# Patient Record
Sex: MALE | Race: BLACK OR AFRICAN AMERICAN | Employment: OTHER
[De-identification: names, ages, dates, MRNs, and addresses within clinical notes are randomized per-mention and may not be internally consistent; named-entity substitution may affect disease eponyms.]

---

## 2024-05-30 ENCOUNTER — APPOINTMENT (OUTPATIENT)
Facility: HOSPITAL | Age: 24
End: 2024-05-30

## 2024-05-30 ENCOUNTER — HOSPITAL ENCOUNTER (EMERGENCY)
Facility: HOSPITAL | Age: 24
Discharge: HOME | End: 2024-05-30
Attending: EMERGENCY MEDICINE

## 2024-05-30 VITALS
HEART RATE: 73 BPM | OXYGEN SATURATION: 100 % | TEMPERATURE: 98.6 F | HEIGHT: 67 IN | RESPIRATION RATE: 18 BRPM | DIASTOLIC BLOOD PRESSURE: 77 MMHG | SYSTOLIC BLOOD PRESSURE: 119 MMHG | BODY MASS INDEX: 21.19 KG/M2 | WEIGHT: 135 LBS

## 2024-05-30 DIAGNOSIS — R10.13 ABDOMINAL PAIN, EPIGASTRIC: Primary | ICD-10-CM

## 2024-05-30 LAB
ALBUMIN SERPL-MCNC: 4.1 G/DL (ref 3.5–5)
ALBUMIN/GLOB SERPL: 1.1 (ref 1.1–2.2)
ALP SERPL-CCNC: 58 U/L (ref 45–117)
ALT SERPL-CCNC: 24 U/L (ref 12–78)
ANION GAP SERPL CALC-SCNC: 7 MMOL/L (ref 5–15)
APPEARANCE UR: CLEAR
AST SERPL W P-5'-P-CCNC: 22 U/L (ref 15–37)
BACTERIA URNS QL MICRO: NEGATIVE /HPF
BASOPHILS # BLD: 0 K/UL (ref 0–0.1)
BASOPHILS NFR BLD: 0 % (ref 0–1)
BILIRUB SERPL-MCNC: 2.1 MG/DL (ref 0.2–1)
BILIRUB UR QL: NEGATIVE
BUN SERPL-MCNC: 10 MG/DL (ref 6–20)
BUN/CREAT SERPL: 11 (ref 12–20)
CA-I BLD-MCNC: 9 MG/DL (ref 8.5–10.1)
CHLORIDE SERPL-SCNC: 102 MMOL/L (ref 97–108)
CO2 SERPL-SCNC: 30 MMOL/L (ref 21–32)
COLOR UR: YELLOW
CREAT SERPL-MCNC: 0.91 MG/DL (ref 0.7–1.3)
DIFFERENTIAL METHOD BLD: NORMAL
EOSINOPHIL # BLD: 0.3 K/UL (ref 0–0.4)
EOSINOPHIL NFR BLD: 4 % (ref 0–7)
EPITH CASTS URNS QL MICRO: ABNORMAL /LPF
ERYTHROCYTE [DISTWIDTH] IN BLOOD BY AUTOMATED COUNT: 12 % (ref 11.5–14.5)
GLOBULIN SER CALC-MCNC: 3.8 G/DL (ref 2–4)
GLUCOSE SERPL-MCNC: 85 MG/DL (ref 65–100)
GLUCOSE UR STRIP.AUTO-MCNC: NEGATIVE MG/DL
HCT VFR BLD AUTO: 45.9 % (ref 36.6–50.3)
HGB BLD-MCNC: 15.9 G/DL (ref 12.1–17)
HGB UR QL STRIP: NEGATIVE
IMM GRANULOCYTES # BLD AUTO: 0 K/UL (ref 0–0.04)
IMM GRANULOCYTES NFR BLD AUTO: 0 % (ref 0–0.5)
KETONES UR QL STRIP.AUTO: NEGATIVE MG/DL
LEUKOCYTE ESTERASE UR QL STRIP.AUTO: NEGATIVE
LIPASE SERPL-CCNC: 107 U/L (ref 13–75)
LYMPHOCYTES # BLD: 3 K/UL (ref 0.8–3.5)
LYMPHOCYTES NFR BLD: 38 % (ref 12–49)
MCH RBC QN AUTO: 30.1 PG (ref 26–34)
MCHC RBC AUTO-ENTMCNC: 34.6 G/DL (ref 30–36.5)
MCV RBC AUTO: 86.9 FL (ref 80–99)
MONOCYTES # BLD: 0.8 K/UL (ref 0–1)
MONOCYTES NFR BLD: 10 % (ref 5–13)
NEUTS SEG # BLD: 3.8 K/UL (ref 1.8–8)
NEUTS SEG NFR BLD: 48 % (ref 32–75)
NITRITE UR QL STRIP.AUTO: NEGATIVE
PH UR STRIP: 8 (ref 5–8)
PLATELET # BLD AUTO: 227 K/UL (ref 150–400)
PMV BLD AUTO: 10 FL (ref 8.9–12.9)
POTASSIUM SERPL-SCNC: 4.2 MMOL/L (ref 3.5–5.1)
PROT SERPL-MCNC: 7.9 G/DL (ref 6.4–8.2)
PROT UR STRIP-MCNC: NEGATIVE MG/DL
RBC # BLD AUTO: 5.28 M/UL (ref 4.1–5.7)
RBC #/AREA URNS HPF: ABNORMAL /HPF (ref 0–5)
SODIUM SERPL-SCNC: 139 MMOL/L (ref 136–145)
SP GR UR REFRACTOMETRY: 1.01 (ref 1–1.03)
URINE CULTURE IF INDICATED: ABNORMAL
UROBILINOGEN UR QL STRIP.AUTO: 0.1 EU/DL (ref 0.2–1)
WBC # BLD AUTO: 7.9 K/UL (ref 4.1–11.1)
WBC URNS QL MICRO: ABNORMAL /HPF (ref 0–4)

## 2024-05-30 PROCEDURE — 85025 COMPLETE CBC W/AUTO DIFF WBC: CPT

## 2024-05-30 PROCEDURE — 6360000002 HC RX W HCPCS: Performed by: EMERGENCY MEDICINE

## 2024-05-30 PROCEDURE — 76705 ECHO EXAM OF ABDOMEN: CPT

## 2024-05-30 PROCEDURE — 81001 URINALYSIS AUTO W/SCOPE: CPT

## 2024-05-30 PROCEDURE — 80053 COMPREHEN METABOLIC PANEL: CPT

## 2024-05-30 PROCEDURE — 96374 THER/PROPH/DIAG INJ IV PUSH: CPT

## 2024-05-30 PROCEDURE — 99284 EMERGENCY DEPT VISIT MOD MDM: CPT

## 2024-05-30 PROCEDURE — 83690 ASSAY OF LIPASE: CPT

## 2024-05-30 RX ORDER — KETOROLAC TROMETHAMINE 15 MG/ML
15 INJECTION, SOLUTION INTRAMUSCULAR; INTRAVENOUS ONCE
Status: COMPLETED | OUTPATIENT
Start: 2024-05-30 | End: 2024-05-30

## 2024-05-30 RX ADMIN — KETOROLAC TROMETHAMINE 15 MG: 15 INJECTION, SOLUTION INTRAMUSCULAR; INTRAVENOUS at 19:41

## 2024-05-30 ASSESSMENT — PAIN - FUNCTIONAL ASSESSMENT
PAIN_FUNCTIONAL_ASSESSMENT: 0-10
PAIN_FUNCTIONAL_ASSESSMENT: 0-10

## 2024-05-30 ASSESSMENT — PAIN DESCRIPTION - LOCATION: LOCATION: ABDOMEN

## 2024-05-30 ASSESSMENT — PAIN SCALES - GENERAL
PAINLEVEL_OUTOF10: 8
PAINLEVEL_OUTOF10: 2

## 2024-05-30 NOTE — ED PROVIDER NOTES
[facility] EMERGENCY DEPARTMENT  EMERGENCY DEPARTMENT HISTORY AND PHYSICAL EXAM      Date: 5/30/2024  Patient Name: Jonathon Casillas  MRN: [id number]  Birthdate 2000  Date of evaluation: 5/30/2024  Provider: Kam Sam DO   Note Started: 7:08 PM EDT 5/30/24    HISTORY OF PRESENT ILLNESS     Chief Complaint   Patient presents with    Abdominal Pain     History Provided By: Patient    HPI: Jonathon Casillas is a 23 y.o. male with no past medical history who presents with abdominal pain.  Patient has had 3 days of abdominal pain in the upper abdomen and on the right abdomen.  Has been fairly constant and 7/10.  Denies any nausea, vomiting, fevers, diarrhea, or other associated symptoms.  States he did have this problem when he was younger and it was constipation.  He has been trying to eat more fruits and vegetables.  He has been having some BMs however less.    PAST MEDICAL HISTORY   Past Medical History:  No past medical history on file.    Past Surgical History:  No past surgical history on file.    Family History:  No family history on file.    Social History:       Allergies:  Allergies   Allergen Reactions    Shellfish Allergy Hives       PCP: No primary care provider on file.    Current Meds:   No current facility-administered medications for this encounter.     No current outpatient medications on file.     Social Determinants of Health:   Social Determinants of Health     Tobacco Use: Not on file   Alcohol Use: Not on file   Financial Resource Strain: Not on file   Food Insecurity: Not on file   Transportation Needs: Not on file   Physical Activity: Not on file   Stress: Not on file   Social Connections: Not on file   Intimate Partner Violence: Not on file   Depression: Not on file   Housing Stability: Not on file   Interpersonal Safety: Not on file   Utilities: Not on file     PHYSICAL EXAM   Constitutional: No acute distress.  Cardiac: Normal perfusion.  Respiratory: No apparent respiratory

## 2024-05-31 NOTE — DISCHARGE INSTRUCTIONS
Thank you for choosing our Emergency Department for your care.  It is our privilege to care for you in your time of need.  In the next several days, you may receive a survey via email or mailed to your home about your experience with our team.  We would greatly appreciate you taking a few minutes to complete the survey, as we use this information to learn what we have done well and what we could be doing better. Thank you for trusting us with your care!    Below you will find a list of your tests from today's visit.   Labs  Recent Results (from the past 12 hour(s))   CBC with Auto Differential    Collection Time: 05/30/24  6:10 PM   Result Value Ref Range    WBC 7.9 4.1 - 11.1 K/uL    RBC 5.28 4.10 - 5.70 M/uL    Hemoglobin 15.9 12.1 - 17.0 g/dL    Hematocrit 45.9 36.6 - 50.3 %    MCV 86.9 80.0 - 99.0 FL    MCH 30.1 26.0 - 34.0 PG    MCHC 34.6 30.0 - 36.5 g/dL    RDW 12.0 11.5 - 14.5 %    Platelets 227 150 - 400 K/uL    MPV 10.0 8.9 - 12.9 FL    Neutrophils % 48 32 - 75 %    Lymphocytes % 38 12 - 49 %    Monocytes % 10 5 - 13 %    Eosinophils % 4 0 - 7 %    Basophils % 0 0 - 1 %    Immature Granulocytes % 0 0.0 - 0.5 %    Neutrophils Absolute 3.8 1.8 - 8.0 K/UL    Lymphocytes Absolute 3.0 0.8 - 3.5 K/UL    Monocytes Absolute 0.8 0.0 - 1.0 K/UL    Eosinophils Absolute 0.3 0.0 - 0.4 K/UL    Basophils Absolute 0.0 0.0 - 0.1 K/UL    Immature Granulocytes Absolute 0.0 0.00 - 0.04 K/UL    Differential Type AUTOMATED     Comprehensive Metabolic Panel    Collection Time: 05/30/24  6:10 PM   Result Value Ref Range    Sodium 139 136 - 145 mmol/L    Potassium 4.2 3.5 - 5.1 mmol/L    Chloride 102 97 - 108 mmol/L    CO2 30 21 - 32 mmol/L    Anion Gap 7 5 - 15 mmol/L    Glucose 85 65 - 100 mg/dL    BUN 10 6 - 20 mg/dL    Creatinine 0.91 0.70 - 1.30 mg/dL    BUN/Creatinine Ratio 11 (L) 12 - 20      Est, Glom Filt Rate >90 >60 ml/min/1.73m2    Calcium 9.0 8.5 - 10.1 mg/dL    Total Bilirubin 2.1 (H) 0.2 - 1.0 mg/dL    AST 22 15

## 2025-02-25 ENCOUNTER — HOSPITAL ENCOUNTER (EMERGENCY)
Facility: HOSPITAL | Age: 25
Discharge: HOME | End: 2025-02-25
Attending: FAMILY MEDICINE
Payer: COMMERCIAL

## 2025-02-25 VITALS
TEMPERATURE: 98.2 F | HEART RATE: 70 BPM | WEIGHT: 140 LBS | RESPIRATION RATE: 19 BRPM | DIASTOLIC BLOOD PRESSURE: 74 MMHG | BODY MASS INDEX: 21.97 KG/M2 | OXYGEN SATURATION: 99 % | HEIGHT: 67 IN | SYSTOLIC BLOOD PRESSURE: 121 MMHG

## 2025-02-25 DIAGNOSIS — T78.40XA ALLERGIC REACTION, INITIAL ENCOUNTER: Primary | ICD-10-CM

## 2025-02-25 PROCEDURE — 6360000002 HC RX W HCPCS: Performed by: FAMILY MEDICINE

## 2025-02-25 PROCEDURE — 6370000000 HC RX 637 (ALT 250 FOR IP): Performed by: FAMILY MEDICINE

## 2025-02-25 PROCEDURE — 99284 EMERGENCY DEPT VISIT MOD MDM: CPT

## 2025-02-25 PROCEDURE — 96372 THER/PROPH/DIAG INJ SC/IM: CPT

## 2025-02-25 RX ORDER — FAMOTIDINE 20 MG/1
20 TABLET, FILM COATED ORAL
Status: COMPLETED | OUTPATIENT
Start: 2025-02-25 | End: 2025-02-25

## 2025-02-25 RX ORDER — DIPHENHYDRAMINE HCL 25 MG
25 CAPSULE ORAL EVERY 8 HOURS PRN
Qty: 9 CAPSULE | Refills: 0 | Status: SHIPPED | OUTPATIENT
Start: 2025-02-25 | End: 2025-02-28

## 2025-02-25 RX ORDER — PREDNISONE 20 MG/1
40 TABLET ORAL DAILY
Qty: 10 TABLET | Refills: 0 | Status: SHIPPED | OUTPATIENT
Start: 2025-02-25 | End: 2025-03-02

## 2025-02-25 RX ORDER — EPINEPHRINE 0.3 MG/.3ML
0.3 INJECTION SUBCUTANEOUS ONCE
Status: COMPLETED | OUTPATIENT
Start: 2025-02-25 | End: 2025-02-25

## 2025-02-25 RX ORDER — DIPHENHYDRAMINE HCL 25 MG
50 CAPSULE ORAL ONCE
Status: COMPLETED | OUTPATIENT
Start: 2025-02-25 | End: 2025-02-25

## 2025-02-25 RX ORDER — PREDNISONE 20 MG/1
60 TABLET ORAL
Status: COMPLETED | OUTPATIENT
Start: 2025-02-25 | End: 2025-02-25

## 2025-02-25 RX ORDER — EPINEPHRINE 0.3 MG/.3ML
0.3 INJECTION SUBCUTANEOUS
Qty: 1 EACH | Refills: 0 | Status: SHIPPED | OUTPATIENT
Start: 2025-02-25 | End: 2025-02-25

## 2025-02-25 RX ADMIN — DIPHENHYDRAMINE HYDROCHLORIDE 50 MG: 25 CAPSULE ORAL at 07:32

## 2025-02-25 RX ADMIN — EPINEPHRINE 0.3 MG: 0.3 INJECTION INTRAMUSCULAR at 07:55

## 2025-02-25 RX ADMIN — FAMOTIDINE 20 MG: 20 TABLET, FILM COATED ORAL at 07:32

## 2025-02-25 RX ADMIN — PREDNISONE 60 MG: 20 TABLET ORAL at 07:32

## 2025-02-25 ASSESSMENT — PAIN - FUNCTIONAL ASSESSMENT
PAIN_FUNCTIONAL_ASSESSMENT: NONE - DENIES PAIN
PAIN_FUNCTIONAL_ASSESSMENT: NONE - DENIES PAIN
PAIN_FUNCTIONAL_ASSESSMENT: 0-10

## 2025-02-25 ASSESSMENT — PAIN DESCRIPTION - DESCRIPTORS: DESCRIPTORS: TIGHTNESS

## 2025-02-25 ASSESSMENT — PAIN SCALES - GENERAL
PAINLEVEL_OUTOF10: 0
PAINLEVEL_OUTOF10: 8

## 2025-02-25 ASSESSMENT — PAIN DESCRIPTION - ORIENTATION: ORIENTATION: MID

## 2025-02-25 ASSESSMENT — LIFESTYLE VARIABLES
HOW OFTEN DO YOU HAVE A DRINK CONTAINING ALCOHOL: NEVER
HOW MANY STANDARD DRINKS CONTAINING ALCOHOL DO YOU HAVE ON A TYPICAL DAY: PATIENT DOES NOT DRINK

## 2025-02-25 ASSESSMENT — PAIN DESCRIPTION - LOCATION: LOCATION: CHEST

## 2025-02-25 NOTE — ED NOTES
Pts facial swelling and red, raised, and itchy bumps are notably improved. Pt reports he feels \"about the same.\"

## 2025-02-25 NOTE — ED NOTES
Pt reporting intermittent epigastric chest pain and throat tightness with difficulty swallowing. Physician informed and order for EpiPen placed.

## 2025-02-25 NOTE — ED TRIAGE NOTES
Pt reports hives that started around 1100 on Sunday. Pt reports wheezing that started yesterday. Pt now presents with facial swelling prominent around the eyes. Pt is unsure of the causative agent.

## 2025-02-26 NOTE — ED PROVIDER NOTES
EMERGENCY DEPARTMENT HISTORY AND PHYSICAL EXAM      Date: 2/25/2025  Patient Name: Jonathon Casillas    History of Presenting Illness     Chief Complaint   Patient presents with    Facial Swelling       History Provided By:     HPI: Jonathon Casillas, is a very pleasant 24 y.o. male presenting to the ED with a chief complaint of facial swelling.  States he recently developed hives and facial swelling.  Predominantly around his eyelids and cheeks.  Reports a history of allergies to shellfish but denies any known exposures.  No chest pain or shortness of breath.    Denies any other symptoms at this time.    PCP: No primary care provider on file.    No current facility-administered medications on file prior to encounter.     No current outpatient medications on file prior to encounter.       Past History     Past Medical History:  No past medical history on file.    Past Surgical History:  No past surgical history on file.    Family History:  No family history on file.    Social History:       Allergies:  Allergies   Allergen Reactions    Shellfish Allergy Hives         Review of Systems     Negative unless otherwise stated in HPI    Physical Exam     Physical Exam  Constitutional:       Appearance: Normal appearance.   HENT:      Head: Normocephalic and atraumatic.      Right Ear: External ear normal.      Left Ear: External ear normal.      Nose: Nose normal.      Mouth/Throat:      Comments: No lip nor intraoral swelling.  Uvula midline.  Tolerating secretions.  No stridor.  Eyes:      Extraocular Movements: Extraocular movements intact.      Conjunctiva/sclera: Conjunctivae normal.   Cardiovascular:      Rate and Rhythm: Normal rate and regular rhythm.   Pulmonary:      Effort: Pulmonary effort is normal. No respiratory distress.   Abdominal:      Palpations: Abdomen is soft.      Tenderness: There is no abdominal tenderness. There is no right CVA tenderness, left CVA tenderness, guarding or rebound.

## 2025-07-01 ENCOUNTER — APPOINTMENT (OUTPATIENT)
Facility: HOSPITAL | Age: 25
End: 2025-07-01

## 2025-07-01 ENCOUNTER — HOSPITAL ENCOUNTER (EMERGENCY)
Facility: HOSPITAL | Age: 25
Discharge: HOME | End: 2025-07-01
Attending: EMERGENCY MEDICINE

## 2025-07-01 VITALS
SYSTOLIC BLOOD PRESSURE: 130 MMHG | RESPIRATION RATE: 16 BRPM | BODY MASS INDEX: 21.97 KG/M2 | HEIGHT: 67 IN | OXYGEN SATURATION: 100 % | HEART RATE: 77 BPM | TEMPERATURE: 97.3 F | DIASTOLIC BLOOD PRESSURE: 72 MMHG | WEIGHT: 140 LBS

## 2025-07-01 DIAGNOSIS — S61.220A LACERATION OF RIGHT INDEX FINGER WITH FOREIGN BODY WITHOUT DAMAGE TO NAIL, INITIAL ENCOUNTER: Primary | ICD-10-CM

## 2025-07-01 PROCEDURE — 6370000000 HC RX 637 (ALT 250 FOR IP): Performed by: EMERGENCY MEDICINE

## 2025-07-01 PROCEDURE — 99283 EMERGENCY DEPT VISIT LOW MDM: CPT

## 2025-07-01 PROCEDURE — 73130 X-RAY EXAM OF HAND: CPT

## 2025-07-01 PROCEDURE — 12044 INTMD RPR N-HF/GENIT7.6-12.5: CPT

## 2025-07-01 PROCEDURE — 6360000002 HC RX W HCPCS: Performed by: EMERGENCY MEDICINE

## 2025-07-01 RX ORDER — HYDROCODONE BITARTRATE AND ACETAMINOPHEN 5; 325 MG/1; MG/1
1 TABLET ORAL
Refills: 0 | Status: COMPLETED | OUTPATIENT
Start: 2025-07-01 | End: 2025-07-01

## 2025-07-01 RX ORDER — GINSENG 100 MG
CAPSULE ORAL
Status: COMPLETED | OUTPATIENT
Start: 2025-07-01 | End: 2025-07-01

## 2025-07-01 RX ORDER — LIDOCAINE HYDROCHLORIDE 10 MG/ML
5 INJECTION, SOLUTION INFILTRATION; PERINEURAL ONCE
Status: DISCONTINUED | OUTPATIENT
Start: 2025-07-01 | End: 2025-07-01 | Stop reason: HOSPADM

## 2025-07-01 RX ORDER — LIDOCAINE HYDROCHLORIDE 10 MG/ML
10 INJECTION, SOLUTION EPIDURAL; INFILTRATION; INTRACAUDAL; PERINEURAL
Status: COMPLETED | OUTPATIENT
Start: 2025-07-01 | End: 2025-07-01

## 2025-07-01 RX ADMIN — HYDROCODONE BITARTRATE AND ACETAMINOPHEN 1 TABLET: 5; 325 TABLET ORAL at 19:37

## 2025-07-01 RX ADMIN — AMOXICILLIN AND CLAVULANATE POTASSIUM 1 TABLET: 875; 125 TABLET, FILM COATED ORAL at 20:31

## 2025-07-01 RX ADMIN — LIDOCAINE HYDROCHLORIDE 10 ML: 10 INJECTION, SOLUTION EPIDURAL; INFILTRATION; INTRACAUDAL; PERINEURAL at 20:11

## 2025-07-01 RX ADMIN — BACITRACIN: 500 OINTMENT TOPICAL at 20:31

## 2025-07-01 ASSESSMENT — PAIN DESCRIPTION - ORIENTATION
ORIENTATION: RIGHT
ORIENTATION: RIGHT

## 2025-07-01 ASSESSMENT — PAIN SCALES - GENERAL
PAINLEVEL_OUTOF10: 10
PAINLEVEL_OUTOF10: 10

## 2025-07-01 ASSESSMENT — PAIN DESCRIPTION - LOCATION
LOCATION: FINGER (COMMENT WHICH ONE)
LOCATION: HAND

## 2025-07-01 ASSESSMENT — PAIN - FUNCTIONAL ASSESSMENT: PAIN_FUNCTIONAL_ASSESSMENT: 0-10

## 2025-07-01 ASSESSMENT — LIFESTYLE VARIABLES
HOW MANY STANDARD DRINKS CONTAINING ALCOHOL DO YOU HAVE ON A TYPICAL DAY: PATIENT DOES NOT DRINK
HOW OFTEN DO YOU HAVE A DRINK CONTAINING ALCOHOL: MONTHLY OR LESS

## 2025-07-02 NOTE — ED PROVIDER NOTES
[facility] EMERGENCY DEPT  EMERGENCY DEPARTMENT HISTORY AND PHYSICAL EXAM      Date of evaluation: 7/1/2025  Patient Name: Jonathon Casillas  Birthdate 2000  MRN: [id number]  ED Provider: Sapna Castellanos MD   Note Started: 8:29 PM EDT 7/1/25    HISTORY OF PRESENT ILLNESS     Chief Complaint   Patient presents with    Hand Injury       History Provided By: Patient, only     HPI: Jonathon Casillas is a 24 y.o. male patient with his hand through a bridge door 1 no pain with a laceration to his index hand.  Bleeding still present.  Immunizations including tetanus are up-to-date    PAST MEDICAL HISTORY   Past Medical History:  History reviewed. No pertinent past medical history.    Past Surgical History:  History reviewed. No pertinent surgical history.    Family History:  History reviewed. No pertinent family history.    Social History:  Social History     Tobacco Use    Smoking status: Never    Smokeless tobacco: Never   Substance Use Topics    Alcohol use: Never    Drug use: Never       Allergies:  Allergies   Allergen Reactions    Shellfish Allergy Hives       PCP: No primary care provider on file.    Current Meds:   Current Facility-Administered Medications   Medication Dose Route Frequency Provider Last Rate Last Admin    lidocaine 1 % injection 5 mL  5 mL IntraDERmal Once Sapna Castellanos MD        amoxicillin-clavulanate (AUGMENTIN) 875-125 MG per tablet 1 tablet  1 tablet Oral NOW Sapna Castellanos MD        bacitracin ointment   Topical NOW Sapna Castellanos MD         Current Outpatient Medications   Medication Sig Dispense Refill    amoxicillin-clavulanate (AUGMENTIN) 875-125 MG per tablet Take 1 tablet by mouth 2 times daily for 10 days 20 tablet 0    EPINEPHrine (EPIPEN 2-CHEYENNE) 0.3 MG/0.3ML SOAJ injection Inject 0.3 mLs into the muscle once as needed (Severe allergy) Use as directed for allergic reaction 1 each 0       Social Determinants of Health:   Social Drivers of Health     Tobacco Use: Low Risk

## 2025-07-02 NOTE — DISCHARGE INSTRUCTIONS
Thank you for choosing our Emergency Department for your care.  It is our privilege to care for you in your time of need.  In the next several days, you may receive a survey via email or mailed to your home about your experience with our team.  We would greatly appreciate you taking a few minutes to complete the survey, as we use this information to learn what we have done well and what we could be doing better. Thank you for trusting us with your care!    Below you will find a list of your tests from today's visit.   Labs and Radiology Studies  No results found for this or any previous visit (from the past 12 hours).  XR HAND RIGHT (MIN 3 VIEWS)  Result Date: 7/1/2025  EXAM: XR HAND RIGHT (MIN 3 VIEWS) INDICATION: finger lac. fb?. COMPARISON: None. FINDINGS: Three views of the right hand demonstrate no fracture or other acute osseous or articular abnormality. There is soft tissue injury to the second digit, with 2 sub-3 mm foreign bodies in the superficial soft tissues overlying the middle phalanx on the lateral view.     2 sub-3 mm foreign bodies at the level of the middle phalanx of the index finger. No fracture. Electronically signed by Ladarius Talavera MD    ------------------------------------------------------------------------------------------------------------  The evaluation and treatment you received in the Emergency Department were for an urgent problem. It is important that you follow-up with a doctor, nurse practitioner, or physician assistant to:  (1) confirm your diagnosis,  (2) re-evaluation of changes in your illness and treatment, and (3) for ongoing care. Please take your discharge instructions with you when you go to your follow-up appointment.     If you have any problem arranging a follow-up appointment, contact us!  If your symptoms become worse or you do not improve as expected, please return to us. We are available 24 hours a day.     If a prescription has been provided, please fill it as

## 2025-07-11 ENCOUNTER — HOSPITAL ENCOUNTER (EMERGENCY)
Facility: HOSPITAL | Age: 25
Discharge: HOME | End: 2025-07-11

## 2025-07-11 VITALS
RESPIRATION RATE: 16 BRPM | OXYGEN SATURATION: 98 % | TEMPERATURE: 99.4 F | SYSTOLIC BLOOD PRESSURE: 125 MMHG | DIASTOLIC BLOOD PRESSURE: 73 MMHG | HEART RATE: 72 BPM

## 2025-07-11 DIAGNOSIS — Z48.02 VISIT FOR SUTURE REMOVAL: Primary | ICD-10-CM

## 2025-07-11 ASSESSMENT — PAIN - FUNCTIONAL ASSESSMENT: PAIN_FUNCTIONAL_ASSESSMENT: 0-10

## 2025-07-11 ASSESSMENT — PAIN SCALES - GENERAL: PAINLEVEL_OUTOF10: 0

## 2025-07-11 NOTE — ED PROVIDER NOTES
[facility] EMERGENCY DEPT  EMERGENCY DEPARTMENT HISTORY AND PHYSICAL EXAM      Date of evaluation: 7/11/2025  Patient Name: Jonathon Casillas  Birthdate 2000  MRN: [id number]  ED Provider: Zack Gomez PA-C   Note Started: 4:08 PM EDT 7/11/25    HISTORY OF PRESENT ILLNESS     Chief Complaint   Patient presents with    Suture / Staple Removal       History Provided By: Patient, only     HPI: Jonathon Casillas is a 24 y.o. male with no significant medical history presents this ED for suture removal.  Patient had 5 sutures placed to his right index finger on 7/1/2025.  Presents today for removal.  No other complaints concerns.    PAST MEDICAL HISTORY   Past Medical History:  No past medical history on file.    Past Surgical History:  No past surgical history on file.    Family History:  No family history on file.    Social History:  Social History     Tobacco Use    Smoking status: Never    Smokeless tobacco: Never   Substance Use Topics    Alcohol use: Never    Drug use: Never       Allergies:  Allergies   Allergen Reactions    Shellfish Allergy Hives       PCP: No primary care provider on file.    Current Meds:   No current facility-administered medications for this encounter.     Current Outpatient Medications   Medication Sig Dispense Refill    EPINEPHrine (EPIPEN 2-CHEYENNE) 0.3 MG/0.3ML SOAJ injection Inject 0.3 mLs into the muscle once as needed (Severe allergy) Use as directed for allergic reaction 1 each 0       Social Determinants of Health:   Social Drivers of Health     Tobacco Use: Low Risk  (7/1/2025)    Patient History     Smoking Tobacco Use: Never     Smokeless Tobacco Use: Never     Passive Exposure: Not on file   Alcohol Use: Not At Risk (7/1/2025)    AUDIT-C     Frequency of Alcohol Consumption: Monthly or less     Average Number of Drinks: Patient does not drink     Frequency of Binge Drinking: Never   Financial Resource Strain: Not on file   Food Insecurity: Not on file   Transportation Needs: Not on  procedure took 1-15 minutes, and pt tolerated well.    SEPSIS REASSESSMENT & CRITICAL CARE TIME   SEPSIS REASSESSMENT: Patient does NOT meet Sepsis criteria after ED workup    Patient does not meet Critical Care Time, 0 minutes  CLINICAL IMPRESSIONS     1. Visit for suture removal       SDOH/DISPOSITION/PLAN   Social Determinants affecting Treatment Plan: None    DISPOSITION Decision To Discharge 07/11/2025 03:54:06 PM   DISPOSITION CONDITION Stable         Discharge Note: The patient is stable for discharge home. The signs, symptoms, diagnosis, and discharge instructions have been discussed, understanding conveyed, and agreed upon. The patient is to follow up as recommended or return to ER should their symptoms worsen.      PATIENT REFERRED TO:  Your Primary Care Provider    Schedule an appointment as soon as possible for a visit   As needed    [facility]  [address] [facility] [place] [address] [phone]    If symptoms worsen        DISCHARGE MEDICATIONS:     Medication List        ASK your doctor about these medications      amoxicillin-clavulanate 875-125 MG per tablet  Commonly known as: AUGMENTIN  Take 1 tablet by mouth 2 times daily for 10 days     EPINEPHrine 0.3 MG/0.3ML Soaj injection  Commonly known as: EpiPen 2-Jason  Inject 0.3 mLs into the muscle once as needed (Severe allergy) Use as directed for allergic reaction                DISCONTINUED MEDICATIONS:  Current Discharge Medication List          I am the Primary Clinician of Record. Zcak Gomez PA-C (electronically signed)    (Please note that parts of this dictation were completed with voice recognition software. Quite often unanticipated grammatical, syntax, homophones, and other interpretive errors are inadvertently transcribed by the computer software. Please disregards these errors. Please excuse any errors that have escaped final proofreading.)

## 2025-07-23 ENCOUNTER — HOSPITAL ENCOUNTER (EMERGENCY)
Facility: HOSPITAL | Age: 25
Discharge: HOME | End: 2025-07-23
Attending: STUDENT IN AN ORGANIZED HEALTH CARE EDUCATION/TRAINING PROGRAM

## 2025-07-23 VITALS
HEART RATE: 63 BPM | OXYGEN SATURATION: 99 % | SYSTOLIC BLOOD PRESSURE: 135 MMHG | DIASTOLIC BLOOD PRESSURE: 98 MMHG | RESPIRATION RATE: 18 BRPM | BODY MASS INDEX: 21.97 KG/M2 | TEMPERATURE: 98.5 F | HEIGHT: 67 IN | WEIGHT: 140 LBS

## 2025-07-23 DIAGNOSIS — S46.001A INJURY OF RIGHT ROTATOR CUFF, INITIAL ENCOUNTER: Primary | ICD-10-CM

## 2025-07-23 PROCEDURE — 99283 EMERGENCY DEPT VISIT LOW MDM: CPT

## 2025-07-23 PROCEDURE — 6370000000 HC RX 637 (ALT 250 FOR IP): Performed by: STUDENT IN AN ORGANIZED HEALTH CARE EDUCATION/TRAINING PROGRAM

## 2025-07-23 RX ORDER — IBUPROFEN 400 MG/1
400 TABLET, FILM COATED ORAL
Status: COMPLETED | OUTPATIENT
Start: 2025-07-23 | End: 2025-07-23

## 2025-07-23 RX ORDER — ACETAMINOPHEN 500 MG
1000 TABLET ORAL
Status: COMPLETED | OUTPATIENT
Start: 2025-07-23 | End: 2025-07-23

## 2025-07-23 RX ADMIN — IBUPROFEN 400 MG: 400 TABLET, FILM COATED ORAL at 22:23

## 2025-07-23 RX ADMIN — ACETAMINOPHEN 1000 MG: 500 TABLET ORAL at 22:23

## 2025-07-23 ASSESSMENT — PAIN DESCRIPTION - LOCATION
LOCATION: ARM;SHOULDER
LOCATION: SHOULDER

## 2025-07-23 ASSESSMENT — PAIN DESCRIPTION - DESCRIPTORS
DESCRIPTORS: ACHING
DESCRIPTORS: ACHING;THROBBING;SHARP

## 2025-07-23 ASSESSMENT — PAIN - FUNCTIONAL ASSESSMENT
PAIN_FUNCTIONAL_ASSESSMENT: ACTIVITIES ARE NOT PREVENTED
PAIN_FUNCTIONAL_ASSESSMENT: ACTIVITIES ARE NOT PREVENTED
PAIN_FUNCTIONAL_ASSESSMENT: 0-10

## 2025-07-23 ASSESSMENT — PAIN SCALES - GENERAL
PAINLEVEL_OUTOF10: 10
PAINLEVEL_OUTOF10: 10

## 2025-07-23 ASSESSMENT — PAIN DESCRIPTION - PAIN TYPE: TYPE: ACUTE PAIN

## 2025-07-23 ASSESSMENT — PAIN DESCRIPTION - ORIENTATION
ORIENTATION: RIGHT
ORIENTATION: RIGHT

## 2025-07-24 NOTE — DISCHARGE INSTRUCTIONS
Thank you for choosing our Emergency Department for your care.  It is our privilege to care for you in your time of need.  In the next several days, you may receive a survey via email or mailed to your home about your experience with our team.  We would greatly appreciate you taking a few minutes to complete the survey, as we use this information to learn what we have done well and what we could be doing better. Thank you for trusting us with your care!     Please review [employer] for a more detailed result list since the below list may not be comprehensive. Instructions on how to sign up to [employer] should be provided in this packet.    Below you will find a list of your tests from today's visit.     Labs and Radiology Studies  No results found for this or any previous visit (from the past 12 hours).  No results found.    ------------------------------------------------------------------------------------------------------------  The exam and treatment you received in the Emergency Department were for an urgent problem and are not intended as complete care. It is very important that you follow-up with a doctor, nurse practitioner, or physician assistant in a timely manner to:  (1) confirm your diagnosis and review all imaging and lab results,  (2) re-evaluation of changes in your illness and treatment, and  (3) for ongoing care.  If your symptoms become worse, you develop new symptoms, or you do not improve as expected and you are unable to reach your usual health care provider, you should return to the Emergency Department. We are available 24 hours a day.     Please take your discharge instructions and results with you when you go to your follow-up appointment.     If a prescription has been provided, please have it filled as soon as possible to prevent a delay in treatment. Read the entire medication instruction sheet provided to you by the pharmacy. If you have any questions or reservations about taking the

## 2025-07-24 NOTE — ED NOTES
Discharge instructions provided. Pt was given copy of discharge instructions with 0 paper script(s) and 0 electronic script(s). Pt verbalized understanding of the medication instructions, and the importance of following up as recommended by EDP. Pt has no further questions at this time. Pt leaving ED ambulatory and in stable condition.

## 2025-07-24 NOTE — ED PROVIDER NOTES
worse    4.      Medication List        ASK your doctor about these medications      EPINEPHrine 0.3 MG/0.3ML Soaj injection  Commonly known as: EpiPen 2-Jason  Inject 0.3 mLs into the muscle once as needed (Severe allergy) Use as directed for allergic reaction            5. Discontinued Medications:   Discharge Medication List as of 7/23/2025 10:24 PM          Procedures     Unless otherwise noted below, none.    Performed by: Rai Betancourt MD   Procedures      Critical Care Time     Patient does not meet Critical Care Time, 0 minutes    Documentation     I am the Primary Clinician of Record: Rai Betancourt MD (electronically signed)    (Please note that parts of this dictation were completed with voice recognition software. Quite often unanticipated grammatical, syntax, homophones, and other interpretive errors are inadvertently transcribed by the computer software. Please disregards these errors. Please excuse any errors that have escaped final proofreading.)       Rai Betancourt MD  07/24/25 [id number]

## 2025-07-24 NOTE — ED TRIAGE NOTES
Pt presents to ED reporting unbearable R shoulder pain since 1600 today. Reports this has been on and off for months and intermittent between shoulders. Has not taken any medication.

## 2025-07-30 ENCOUNTER — OFFICE VISIT (OUTPATIENT)
Age: 25
End: 2025-07-30

## 2025-07-30 VITALS
SYSTOLIC BLOOD PRESSURE: 134 MMHG | DIASTOLIC BLOOD PRESSURE: 88 MMHG | OXYGEN SATURATION: 98 % | HEART RATE: 88 BPM | WEIGHT: 140 LBS | BODY MASS INDEX: 21.97 KG/M2 | HEIGHT: 67 IN

## 2025-07-30 DIAGNOSIS — M75.21 BICEPS TENDINITIS OF RIGHT UPPER EXTREMITY: ICD-10-CM

## 2025-07-30 DIAGNOSIS — M25.512 CHRONIC LEFT SHOULDER PAIN: ICD-10-CM

## 2025-07-30 DIAGNOSIS — M25.512 CHRONIC LEFT SHOULDER PAIN: Primary | ICD-10-CM

## 2025-07-30 DIAGNOSIS — G89.29 CHRONIC LEFT SHOULDER PAIN: Primary | ICD-10-CM

## 2025-07-30 DIAGNOSIS — G89.29 CHRONIC LEFT SHOULDER PAIN: ICD-10-CM

## 2025-07-30 DIAGNOSIS — M75.22 BICEPS TENDINITIS OF LEFT UPPER EXTREMITY: ICD-10-CM

## 2025-07-30 PROCEDURE — 99203 OFFICE O/P NEW LOW 30 MIN: CPT | Performed by: ORTHOPAEDIC SURGERY

## 2025-07-30 ASSESSMENT — PATIENT HEALTH QUESTIONNAIRE - PHQ9
2. FEELING DOWN, DEPRESSED OR HOPELESS: NOT AT ALL
SUM OF ALL RESPONSES TO PHQ QUESTIONS 1-9: 0
1. LITTLE INTEREST OR PLEASURE IN DOING THINGS: NOT AT ALL

## 2025-07-30 NOTE — PROGRESS NOTES
Identified pt with two pt identifiers (name and ). Reviewed chart in preparation for visit and have obtained necessary documentation.    Jonathon Casillas is a 24 y.o. male New Patient (Shoulder pain can't do push ups without pain, He says now he has pain all the time. The base doctor says it is rotator cuff bilateral )  .    Vitals:    25 1453   BP: 134/88   BP Site: Left Upper Arm   Patient Position: Sitting   BP Cuff Size: Medium Adult   Pulse: 88   SpO2: 98%   Weight: 63.5 kg (140 lb)   Height: 1.702 m (5' 7\")          1. Have you been to the ER, urgent care clinic since your last visit?  Hospitalized since your last visit?  no     2. Have you seen or consulted any other health care providers outside of the [facility] since your last visit?  Include any pap smears or colon screening.  no

## 2025-07-30 NOTE — PROGRESS NOTES
Jonathon Casillas (: 2000) is a 24 y.o. male, new patient, here for evaluation of the following chief complaint(s):  New Patient (Shoulder pain can't do push ups without pain, He says now he has pain all the time. The base doctor says it is rotator cuff bilateral )       ASSESSMENT/PLAN:  Below is the assessment and plan developed based on review of pertinent history, physical exam, labs, studies, and medications.  Available imaging was independently reviewed including 3 views of the bilateral shoulders which were personally reviewed and interpreted by me and showed no evidence of fracture, dislocation or other bony abnormality.    Discussed diagnosis of bilateral shoulder biceps tendinitis, possible SLAP tear with the patient and treatment options.  Recommend continuing nonoperative treatment with rest, activity modification, anti-inflammatories as needed for pain control.  Discussed that it is unusual for him to have migratory joint pains that completely resolved some days.  Recommend further evaluation to rule out any kind of inflammatory arthritis and labs were ordered.  Recommend a trial of physical therapy and a referral was placed.  If he sees no improvement with physical therapy then may consider further evaluation with an MRI.  Patient will follow-up in 8 weeks.  All questions were answered.    1. Chronic left shoulder pain  -     XR SHOULDER RIGHT (MIN 2 VIEWS); Future  -     XR SHOULDER LEFT (MIN 2 VIEWS); Future  -     HIRA by IFA w/Reflex; Future  -     CBC with Auto Differential; Future  -     ESR-Manfred+CRP; Future  -     Rheumatoid Factor; Future  -     Anti-CCP Ab, IgG + IgA (RDL); Future  2. Biceps tendinitis of right upper extremity  -     [facility] - Physical Therapy at [facility]  3. Biceps tendinitis of left upper extremity  -     [facility] - Physical Therapy at [facility]      Return in about 2 months (around 2025).